# Patient Record
Sex: FEMALE | Race: AMERICAN INDIAN OR ALASKA NATIVE
[De-identification: names, ages, dates, MRNs, and addresses within clinical notes are randomized per-mention and may not be internally consistent; named-entity substitution may affect disease eponyms.]

---

## 2018-04-21 ENCOUNTER — HOSPITAL ENCOUNTER (EMERGENCY)
Dept: HOSPITAL 5 - ED | Age: 73
LOS: 1 days | Discharge: HOME | End: 2018-04-22
Payer: MEDICARE

## 2018-04-21 DIAGNOSIS — I10: ICD-10-CM

## 2018-04-21 DIAGNOSIS — F03.90: Primary | ICD-10-CM

## 2018-04-21 DIAGNOSIS — R41.0: ICD-10-CM

## 2018-04-21 DIAGNOSIS — F17.200: ICD-10-CM

## 2018-04-21 DIAGNOSIS — Z91.19: ICD-10-CM

## 2018-04-21 PROCEDURE — 36415 COLL VENOUS BLD VENIPUNCTURE: CPT

## 2018-04-21 PROCEDURE — 70450 CT HEAD/BRAIN W/O DYE: CPT

## 2018-04-21 PROCEDURE — 80048 BASIC METABOLIC PNL TOTAL CA: CPT

## 2018-04-21 PROCEDURE — 85025 COMPLETE CBC W/AUTO DIFF WBC: CPT

## 2018-04-21 PROCEDURE — 85007 BL SMEAR W/DIFF WBC COUNT: CPT

## 2018-04-22 VITALS — DIASTOLIC BLOOD PRESSURE: 85 MMHG | SYSTOLIC BLOOD PRESSURE: 144 MMHG

## 2018-04-22 LAB
BAND NEUTROPHILS # (MANUAL): 0 K/MM3
BUN SERPL-MCNC: 26 MG/DL (ref 7–17)
BUN/CREAT SERPL: 22 %
CALCIUM SERPL-MCNC: 8.7 MG/DL (ref 8.4–10.2)
HCT VFR BLD CALC: 39.3 % (ref 30.3–42.9)
HEMOLYSIS INDEX: 21
HGB BLD-MCNC: 12.8 GM/DL (ref 10.1–14.3)
MCH RBC QN AUTO: 28 PG (ref 28–32)
MCHC RBC AUTO-ENTMCNC: 32 % (ref 30–34)
MCV RBC AUTO: 85 FL (ref 79–97)
MYELOCYTES # (MANUAL): 0.1 K/MM3
PLATELET # BLD: 167 K/MM3 (ref 140–440)
PROMYELOCYTES # (MANUAL): 0 K/MM3
RBC # BLD AUTO: 4.64 M/MM3 (ref 3.65–5.03)
TOTAL CELLS COUNTED BLD: 100

## 2018-04-22 NOTE — EMERGENCY DEPARTMENT REPORT
ED Altered Mental Status HPI





- General


Chief Complaint: Altered Mental Status


Stated Complaint: AMS


Time Seen by Provider: 04/22/18 00:16


Source: patient


Mode of arrival: Stretcher


Limitations: No Limitations





- History of Present Illness


Initial Comments: 





73-year-old demented schizophrenic woman brought in by niece primarily for 

medication refill, as she has been out of her antipsychotic medications for 4 

days, and has been increasingly confused, over and beyond her normal baseline 

of mild dementia.  She has been more and more confused, but she has also been a 

little bit more unsteady, and has been falling, more frequently to the right, 

although niece reports that she has not lost consciousness, and she thinks she 

may have struck her head, but has not witnessed such.  Nonetheless, patient is 

generally compliant him a has not had any overt systemic symptoms, without 

fever chills or diaphoresis, no difficulty breathing, no complaints of pain, 

and has been eating and drinking normally.





Patient has had prior hospitalizations for confusion, encephalopathy, which 

were believed to be secondary to ongoing chronic dementia.  She has had acute 

kidney injury previously, which was felt to be secondary to dehydration, and 

she has had general malnutrition, for which she had a PEG tube placed last year

, but niece reports that patient is eating normally, and has not had have the 

PEG tube for feeding for some time.





She is scheduled for follow-up visit with her primary care doctor at the 

beginning of next month, which is one week away.  She needs sufficient 

medication until she can visit with her doctor.  Niece is also concerned about 

whether her aunt has sustained any stroke or other head injury.


MD Complaint: altered mental status, confusion, other (unsteadiness)


-: days(s) (3-4)


Severity: moderate


Consistency of Symptoms: constant


Context: change in medication (out of medications)


Associated Symptoms: denies other symptoms.  denies: chest pain, cough, fever/

chills, headaches, nausea/vomiting





- Related Data


 Home Medications











 Medication  Instructions  Recorded  Confirmed  Last Taken


 


Acetaminophen [Acetaminophen TAB] 650 mg PO Q6HR PRN 10/10/16 07/18/17 Unknown


 


Divalproex Sodium [Divalproex 500 mg PO QDAY 10/10/16 07/18/17 07/17/17





Sodium ER]    


 


Ergocalciferol [Vitamin D2] 1 cap PO QWEEK 10/10/16 07/18/17 07/17/17


 


FLUoxetine HCL [PROzac] 40 mg PO QDAY 10/10/16 07/18/17 07/17/17


 


Ondansetron [Zofran TAB] 4 mg PO Q8HR PRN 10/10/16 07/18/17 07/17/17


 


Quetiapine Fumarate [QUEtiapine 200 mg PO QHS 10/10/16 07/18/17 07/17/17





Fumarate]    


 


Benztropine [Cogentin] 1 mg PO BID 07/18/17 07/18/17 07/17/17


 


Sennosides [Senna Lax] 8.6 mg PO QHS 07/18/17 07/18/17 07/17/17








 Previous Rx's











 Medication  Instructions  Recorded  Last Taken  Type


 


Docusate Sodium [Colace CAP] 100 mg PO BID #60 capsule 02/17/17 07/17/17 Rx


 


LORazepam [Ativan] 1 mg PO BID PRN #10 tablet 02/17/17 07/17/17 Rx


 


HYDROcodone/APAP 5-325 [[place] 1 each PO Q4HR PRN #7 tablet 07/20/17 Unknown Rx





5-325 mg TAB]    


 


Famotidine [Pepcid] 20 mg PO BID #60 tablet 04/22/18 Unknown Rx


 


Lisinopril [Zestril TAB] 20 mg PO QDAY #30 tablet 04/22/18 Unknown Rx


 


amLODIPine [Norvasc] 10 mg PO DAILY #30 tablet 04/22/18 Unknown Rx











 Allergies











Allergy/AdvReac Type Severity Reaction Status Date / Time


 


morphine Allergy  Unknown Verified 12/11/16 19:51














ED Review of Systems


ROS: 


Stated complaint: AMS


Other details as noted in HPI





Comment: primary history by niece, as patient is confused and unable to give 

sufficient adequate history


Constitutional: other (unsteadiness).  denies: diaphoresis, fever


Eyes: denies: eye pain, eye discharge, vision change


ENT: denies: ear pain, throat pain


Respiratory: no symptoms reported


Cardiovascular: denies: chest pain, palpitations


Endocrine: denies: excessive sweating, increased hunger, increased thirst, 

increased urine


Gastrointestinal: denies: abdominal pain, nausea, vomiting, constipation, 

hematemesis, melena


Genitourinary: denies: urgency, dysuria, frequency, hematuria


Musculoskeletal: denies: back pain, joint swelling, arthralgia


Skin: denies: rash, lesions


Neurological: abnormal gait, vertigo, other (generalized unsteadiness with 

frequent falls)


Psychiatric: as per HPI


Hematological/Lymphatic: denies: easy bleeding





ED Past Medical Hx





- Past Medical History


Hx Hypertension: Yes


Hx Seizures: Yes


Hx Psychiatric Treatment: Yes (anxiety, depression, schizophrenia)


Hx Dementia: Yes


Hx HIV: No


Additional medical history: Headaches, Head inj at 7y, chronic kidney disease, 

vitamin D. defecient





- Surgical History


Additional Surgical History: HYST, PEG tube placement 1 year ago





- Social History


Smoking Status: Current Every Day Smoker


Substance Use Type: None





- Medications


Home Medications: 


 Home Medications











 Medication  Instructions  Recorded  Confirmed  Last Taken  Type


 


Acetaminophen [Acetaminophen TAB] 650 mg PO Q6HR PRN 10/10/16 07/18/17 Unknown 

History


 


Divalproex Sodium [Divalproex 500 mg PO QDAY 10/10/16 07/18/17 07/17/17 History





Sodium ER]     


 


Ergocalciferol [Vitamin D2] 1 cap PO QWEEK 10/10/16 07/18/17 07/17/17 History


 


FLUoxetine HCL [PROzac] 40 mg PO QDAY 10/10/16 07/18/17 07/17/17 History


 


Ondansetron [Zofran TAB] 4 mg PO Q8HR PRN 10/10/16 07/18/17 07/17/17 History


 


Quetiapine Fumarate [QUEtiapine 200 mg PO QHS 10/10/16 07/18/17 07/17/17 History





Fumarate]     


 


Docusate Sodium [Colace CAP] 100 mg PO BID #60 capsule 02/17/17 07/18/17 07/17/ 17 Rx


 


LORazepam [Ativan] 1 mg PO BID PRN #10 tablet 02/17/17 07/18/17 07/17/17 Rx


 


Benztropine [Cogentin] 1 mg PO BID 07/18/17 07/18/17 07/17/17 History


 


Sennosides [Senna Lax] 8.6 mg PO QHS 07/18/17 07/18/17 07/17/17 History


 


HYDROcodone/APAP 5-325 [[place] 1 each PO Q4HR PRN #7 tablet 07/20/17  Unknown Rx





5-325 mg TAB]     


 


Famotidine [Pepcid] 20 mg PO BID #60 tablet 04/22/18  Unknown Rx


 


Lisinopril [Zestril TAB] 20 mg PO QDAY #30 tablet 04/22/18  Unknown Rx


 


amLODIPine [Norvasc] 10 mg PO DAILY #30 tablet 04/22/18  Unknown Rx














ED Physical Exam





- General


Limitations: No Limitations, Altered Mental Status (patient is awake, no acute 

distress, alert and responsive but grossly confused,)


General appearance: in no apparent distress





- Head


Head exam: Present: atraumatic





- Eye


Eye exam: Present: PERRL





- ENT


ENT exam: Present: mucous membranes moist





- Neck


Neck exam: Present: normal inspection.  Absent: tenderness





- Respiratory


Respiratory exam: Absent: normal lung sounds bilaterally, wheezes, rales





- Cardiovascular


Cardiovascular Exam: Present: regular rate





- GI/Abdominal


GI/Abdominal exam: Present: soft, other (PEG tube intact, nontender,)





- Rectal


Rectal exam: Present: deferred





- Extremities Exam


Extremities exam: Present: normal inspection, other (thin, poorly muscled)





- Back Exam


Back exam: Present: normal inspection





- Neurological Exam


Neurological exam: Present: alert, other (oriented to name, cannot give date, 

recognize his daughter, can't describe situation.).  Absent: motor sensory 

deficit





- Psychiatric


Psychiatric exam: Present: normal mood (acutely confused, not anxious).  Absent

: suicidal ideation





- Skin


Skin exam: Present: warm, dry, intact





- Level of Consciousness


1a. Level of Consciousness: alert





- LOC Questions


1b. LOC Questions: answers 1 question correctly (knows name)





- LOC Command


1c. LOC Commands: performs 1 task correctly





- Best Gaze


2. Best Gaze: normal





- Visual


3. Visual: no visual loss





- Facial Palsy


4. Facial Palsy: normal symmetrical movement





- Motor Arm


5b. Motor Arm Right: no drift


5a. Motor Arm Left: no drift





- Motor Leg


6a. Motor Leg Left: no drift


6b. Motor Leg Right: no drift





- Limb Ataxia


7. Limb Ataxia: absent





- Sensory


8. Sensory: normal





- Best Language


9. Best Language: no aphasia





- Dysarthria


10. Dysarthria: normal





- Extinction and Inattention


11. Extinction/Inattention: profound inattention





- Scoring


Total Score: 4


Stroke Severity: Minor Stroke





ED Course


 Vital Signs











  04/21/18 04/22/18 04/22/18





  17:31 01:40 02:00


 


Temperature 97.6 F  98.7 F


 


Pulse Rate 90  102 H


 


Respiratory 16 16 14





Rate   


 


Blood Pressure 139/94  


 


Blood Pressure   144/85





[Left]   


 


O2 Sat by Pulse 95 97 97





Oximetry   














- Reevaluation(s)


Reevaluation #1: 





04/22/18 02:47


Patient remains mildly confused, but is in no acute distress, is up and around 

on the stretcher, somewhat anxious to leave, be an inpatient, with negative CT 

scan, showing only chronic changes, and basically stable CBC and metabolic 

profile.





- Lab Data


Result diagrams: 


 04/22/18 00:43





 04/22/18 00:43


 Lab Results











  04/22/18 04/22/18 Range/Units





  00:43 00:43 


 


WBC  5.1   (4.5-11.0)  K/mm3


 


RBC  4.64   (3.65-5.03)  M/mm3


 


Hgb  12.8   (10.1-14.3)  gm/dl


 


Hct  39.3   (30.3-42.9)  %


 


MCV  85   (79-97)  fl


 


MCH  28   (28-32)  pg


 


MCHC  32   (30-34)  %


 


RDW  15.7 H   (13.2-15.2)  %


 


Plt Count  167   (140-440)  K/mm3


 


Mono % (Auto)  Np   


 


Sodium   139  (137-145)  mmol/L


 


Potassium   4.5  (3.6-5.0)  mmol/L


 


Chloride   103.5  ()  mmol/L


 


Carbon Dioxide   24  (22-30)  mmol/L


 


Anion Gap   16  mmol/L


 


BUN   26 H  (7-17)  mg/dL


 


Creatinine   1.2  (0.7-1.2)  mg/dL


 


Estimated GFR   53  ml/min


 


BUN/Creatinine Ratio   22  %


 


Glucose   74  ()  mg/dL


 


Calcium   8.7  (8.4-10.2)  mg/dL














- Radiology Data


Radiology results: report reviewed


No acute stroke seen on CT scan without contrast, but there is moderate volume 

loss, and what is believed to be small vessel chronic ischemic changes.





- Medical Decision Making





Patient is chronically confused, has had some exacerbation, with running out of 

her antipsychotic sedative medication quetiapine, but when offered initial dose 

in the emergency department, patient refused, and knees concurred, despite 

increased confusion being one of her complaints.  Patient reports that this 

makes her feel bad, and needs his primary concern about CT scan and having 

blood pressure medications refilled.





- Differential Diagnosis


confusion secondary to medication noncompliance, progressive dementia, UTI





- NEXUS Criteria


Focal neurological deficit present: No


Midline spinal tenderness present: No


Altered level of consciousness: Yes (exacerbation of chronic confusion)


Intoxication present: No


Distracting injury present: No


NEXUS results: C-Spine cannot be cleared clinically by these results. Imaging 

is required.


Critical care attestation.: 


If time is entered above; I have spent that time in minutes in the direct care 

of this critically ill patient, excluding procedure time.








ED Disposition


Clinical Impression: 


 Medication noncompliance due to cognitive impairment





Altered mental status


Qualifiers:


 Altered mental status type: disorientation Qualified Code(s): R41.0 - 

Disorientation, unspecified





Dementia


Qualifiers:


 Dementia type: unspecified type 





Disposition: DC-01 TO HOME OR SELFCARE


Is pt being admited?: No


Does the pt Need Aspirin: No


Condition: Stable


Instructions:  Dementia (ED)


Additional Instructions: 


We have refilled medications today for lisinopril, Norvasc, and Pepcid.  You 

have declined to have Seroquel refill.  Please follow-up with your physician 

for further management at recheck in one week.


Prescriptions: 


amLODIPine [Norvasc] 10 mg PO DAILY #30 tablet


Famotidine [Pepcid] 20 mg PO BID #60 tablet


Lisinopril [Zestril TAB] 20 mg PO QDAY #30 tablet


Referrals: 


JOEY WAGNER JR, MD [Primary Care Provider] - 3-5 Days

## 2018-04-22 NOTE — CAT SCAN REPORT
FINAL REPORT



EXAM:  CT HEAD/BRAIN WO CON



HISTORY:  Altered mental status. 



TECHNIQUE:  Unenhanced axial CT images of the brain were

obtained. Comparison is made with prior study 02/10/2017. 



FINDINGS:  

There is mild generalized volume loss, appropriate patient's age.

Nonspecific minimal periventricular and subcortical low

attenuation is seen, most commonly due to mild chronic small

vessel ischemic disease, stable. The gray-white differentiation

is maintained. There is no extra-axial fluid collection, mass,

mass effect, midline shift, hydrocephalus, or acute intracranial

hemorrhage. The overall appearance is not significantly changed

compared to prior exam. 



The visualized paranasal sinuses and mastoid air cells are clear.

There is no skull fracture or other osseous abnormality. The

visualized orbits and globes are grossly unremarkable.



IMPRESSION:  

No acute intracranial abnormality. Mild generalized volume loss

and mild probable chronic small vessel ischemic changes, stable

compared to 02/10/2017.